# Patient Record
Sex: MALE
[De-identification: names, ages, dates, MRNs, and addresses within clinical notes are randomized per-mention and may not be internally consistent; named-entity substitution may affect disease eponyms.]

---

## 2017-07-04 ENCOUNTER — TRANSCRIPTION ENCOUNTER (OUTPATIENT)
Age: 37
End: 2017-07-04

## 2019-05-14 PROBLEM — Z00.00 ENCOUNTER FOR PREVENTIVE HEALTH EXAMINATION: Status: ACTIVE | Noted: 2019-05-14

## 2019-05-21 ENCOUNTER — APPOINTMENT (OUTPATIENT)
Dept: ORTHOPEDIC SURGERY | Facility: CLINIC | Age: 39
End: 2019-05-21

## 2019-05-22 ENCOUNTER — APPOINTMENT (OUTPATIENT)
Dept: ORTHOPEDIC SURGERY | Facility: CLINIC | Age: 39
End: 2019-05-22
Payer: COMMERCIAL

## 2019-05-22 VITALS
HEIGHT: 68 IN | BODY MASS INDEX: 37.13 KG/M2 | HEART RATE: 91 BPM | WEIGHT: 245 LBS | SYSTOLIC BLOOD PRESSURE: 117 MMHG | DIASTOLIC BLOOD PRESSURE: 83 MMHG

## 2019-05-22 DIAGNOSIS — Z78.9 OTHER SPECIFIED HEALTH STATUS: ICD-10-CM

## 2019-05-22 PROCEDURE — 73610 X-RAY EXAM OF ANKLE: CPT | Mod: RT

## 2019-05-22 PROCEDURE — 99203 OFFICE O/P NEW LOW 30 MIN: CPT

## 2019-05-22 RX ORDER — ASPIRIN 325 MG
325 TABLET ORAL
Refills: 0 | Status: ACTIVE | COMMUNITY

## 2019-05-22 NOTE — PHYSICAL EXAM
[de-identified] : General Exam\par \par Well developed, well nourished\par No apparent distress\par Oriented to person, place, and time\par Mood: Normal\par Affect: Normal\par Balance and coordination: Normal\par Gait: Normal\par \par Right ankle exam\par Skin: Clean, dry, intact\par Inspection: No obvious malalignment, no swelling, no effusion, divet noted over Achilles, +erythema lower calf\par Tenderness: +ttp achilles, No tenderness over the lateral malleolus, medial malleolus, CFL/ATFL/PTFL, deltoid ligament. No tenderness proximal fibula. No tenderness about heel, no pain with heel squeeze.\par ROM:weak plantar flexion, ROM intact\par Stability: Negative anterior/posterior drawer.\par Additional tests: Negative Mortons compression test, Negative syndesmosis squeeze test.\par Strength: 4/5 plantar flexion\par Sensation: Intact to light touch in dp/sp/tib/talha/saph distributions\par Pulses: 2+ DP/PT pulses [de-identified] : 3 views R ankle.  Preliminary report- no acute fx/dislocation noted.\par

## 2019-05-22 NOTE — HISTORY OF PRESENT ILLNESS
[de-identified] : 38-year-old male presents complaining of right ankle pain since May 11. He was playing softball when he went to the field a ground ball felt like someone kicked him in the calf with immediate pain posterior ankle. Bruising did develop. He feels like his ankle is weak. He continues to work. He is an . Denies numbness tingling\par \par The patient's past medical history, past surgical history, medications, allergies, and social history were reviewed by me today with the patient and documented accordingly. In addition, the patient's family history, which is noncontributory to this visit, was also reviewed.\par

## 2019-05-30 ENCOUNTER — APPOINTMENT (OUTPATIENT)
Dept: ORTHOPEDIC SURGERY | Facility: CLINIC | Age: 39
End: 2019-05-30
Payer: COMMERCIAL

## 2019-05-30 VITALS — HEART RATE: 85 BPM | DIASTOLIC BLOOD PRESSURE: 80 MMHG | SYSTOLIC BLOOD PRESSURE: 120 MMHG

## 2019-05-30 DIAGNOSIS — S86.001A UNSPECIFIED INJURY OF RIGHT ACHILLES TENDON, INITIAL ENCOUNTER: ICD-10-CM

## 2019-05-30 PROCEDURE — 99213 OFFICE O/P EST LOW 20 MIN: CPT

## 2019-05-30 NOTE — PHYSICAL EXAM
[de-identified] : Oriented to time, place, person\par Mood: Normal\par Affect: Normal\par Appearance: Healthy, well appearing, no acute distress.\par Gait: Antalgic\par Assistive Devices: CAM boot\par \par Right lower extremity exam\par \par Skin: Clean, dry, intact\par Inspection: No obvious malalignment, moderate calf swelling, no effusion, no palpable defect Achilles.\par Pulses: 2+ DP/PT pulses\par ROM: 5 degrees of dorsiflexion, 20 degrees of plantarflexion, normal subtalar motion. \par Tenderness: Tender over myotendinous junction right Achilles. Tender over the distal Achilles stump. No calf pain\par Stability: Stable \par Strength: 3/5 plantar flexion\par Neuro: In tact to light touch throughout\par Additional tests: Negative syndesmosis squeeze test. Flicker with Ruby test [de-identified] : Images were reviewed from [facility] dated 5.22.19. \par \par Multiple images right ankle showed no evidence of bony injury, or sam dislocation. There is no underlying degenerative arthritic change seen. Overall alignment is maintained. Otherwise unremarkable. Achilles tendon shadow intact distally.

## 2019-05-30 NOTE — DISCUSSION/SUMMARY
[de-identified] : 35-year-old male with right ankle injury\par \par Patient is approximately 3 weeks status post right ankle injury consistent with Achilles tendon dysfunction. Appears as though the injury occurred at the myotendinous junction of the right Achilles, and patient has a positive flicker on Ruby testing today. Concern for high-grade injury, but concern for delay in presentation and treatment. Unable to obtain authorization for MRI.\par \par At this time, I do not believe an MRI will  as recommendation would likely be for continued conservative management with a functional rehabilitation program. Discussed long-term outcomes of surgical versus nonsurgical management Achilles ruptures with and without a functional rehabilitation program. Patient as an , and reports that he has to work and is looking to avoid surgical intervention at this time.\par \par By patient request, MRI prescription provided for the lower extremity to rule out high-grade injury. PT prescription provided for functional rehabilitation program. Continue cam immobilization with heel lift per protocol.\par \par Followup 3 weeks, & after MRI

## 2019-06-18 ENCOUNTER — APPOINTMENT (OUTPATIENT)
Dept: ORTHOPEDIC SURGERY | Facility: CLINIC | Age: 39
End: 2019-06-18

## 2019-06-24 ENCOUNTER — APPOINTMENT (OUTPATIENT)
Dept: ORTHOPEDIC SURGERY | Facility: CLINIC | Age: 39
End: 2019-06-24

## 2021-01-22 ENCOUNTER — APPOINTMENT (OUTPATIENT)
Dept: PULMONOLOGY | Facility: CLINIC | Age: 41
End: 2021-01-22
Payer: COMMERCIAL

## 2021-01-22 VITALS
BODY MASS INDEX: 40.81 KG/M2 | HEART RATE: 92 BPM | HEIGHT: 67 IN | DIASTOLIC BLOOD PRESSURE: 72 MMHG | WEIGHT: 260 LBS | SYSTOLIC BLOOD PRESSURE: 108 MMHG | TEMPERATURE: 99.8 F | OXYGEN SATURATION: 97 %

## 2021-01-22 DIAGNOSIS — G47.9 SLEEP DISORDER, UNSPECIFIED: ICD-10-CM

## 2021-01-22 DIAGNOSIS — Z86.39 PERSONAL HISTORY OF OTHER ENDOCRINE, NUTRITIONAL AND METABOLIC DISEASE: ICD-10-CM

## 2021-01-22 DIAGNOSIS — R06.2 WHEEZING: ICD-10-CM

## 2021-01-22 DIAGNOSIS — Z87.898 PERSONAL HISTORY OF OTHER SPECIFIED CONDITIONS: ICD-10-CM

## 2021-01-22 PROCEDURE — 99203 OFFICE O/P NEW LOW 30 MIN: CPT

## 2021-01-22 PROCEDURE — 99072 ADDL SUPL MATRL&STAF TM PHE: CPT

## 2021-01-22 RX ORDER — ALBUTEROL SULFATE 90 UG/1
108 (90 BASE) INHALANT RESPIRATORY (INHALATION)
Refills: 0 | Status: ACTIVE | COMMUNITY
Start: 2021-01-22

## 2021-01-22 NOTE — DISCUSSION/SUMMARY
[FreeTextEntry1] : 41 yo male with complaints consistent with sleep apnea. PSG will be performed. Diet and weight loss discussed. He is to avoid sedative/hypnotic meds and excessive alcohol use. Given the patient's history of wheezing with seasonal allergies, use of montelukast may be needed in the future with continued PRN albuterol MDI use. PFT will be performed in the future. He is to follow up with his PMD as before.

## 2021-01-22 NOTE — PHYSICAL EXAM
[Normal Oropharynx] : normal oropharynx [No Acute Distress] : no acute distress [Normal Appearance] : normal appearance [No Neck Mass] : no neck mass [Normal Rate/Rhythm] : normal rate/rhythm [Normal S1, S2] : normal s1, s2 [No Murmurs] : no murmurs [No Resp Distress] : no resp distress [No Abnormalities] : no abnormalities [Benign] : benign [Normal Gait] : normal gait [No Clubbing] : no clubbing [No Cyanosis] : no cyanosis [No Edema] : no edema [FROM] : FROM [Normal Color/ Pigmentation] : normal color/ pigmentation [No Focal Deficits] : no focal deficits [Oriented x3] : oriented x3 [Normal Affect] : normal affect [TextBox_2] : Increased BMI [TextBox_68] : Prolonged expiratory phase

## 2021-01-22 NOTE — HISTORY OF PRESENT ILLNESS
[Current] : current [< 30 pack-years] : < 30 pack-years [Awakes Unrefreshed] : awakes unrefreshed [Awakes with Dry Mouth] : awakes with dry mouth [Fatigue] : fatigue [Hypersomnolence] : hypersomnolence [Snoring] : snoring [Witnessed Apneas] : witnessed apneas [TextBox_4] : 41 yo male presents for evaluation of possible sleep apnea. The patient snores with apneic episodes. He complains of daytime somnolence and fatigue. His weight has increased. The patient also complains of PRN wheezing, mostly with spring and fall allergies, without SOB or chest pain. He was recently prescribed inhaler.

## 2021-01-22 NOTE — REVIEW OF SYSTEMS
[Fatigue] : fatigue [Recent Wt Gain (___ Lbs)] : ~T recent [unfilled] lb weight gain [Wheezing] : wheezing [Negative] : Endocrine [Cough] : no cough [Sputum] : no sputum

## 2021-01-28 LAB — SARS-COV-2 N GENE NPH QL NAA+PROBE: NOT DETECTED

## 2021-02-03 ENCOUNTER — APPOINTMENT (OUTPATIENT)
Dept: PULMONOLOGY | Facility: CLINIC | Age: 41
End: 2021-02-03
Payer: COMMERCIAL

## 2021-02-03 PROCEDURE — 94729 DIFFUSING CAPACITY: CPT

## 2021-02-03 PROCEDURE — 94060 EVALUATION OF WHEEZING: CPT

## 2021-02-03 PROCEDURE — 99072 ADDL SUPL MATRL&STAF TM PHE: CPT

## 2021-02-03 PROCEDURE — 94727 GAS DIL/WSHOT DETER LNG VOL: CPT

## 2021-03-16 ENCOUNTER — APPOINTMENT (OUTPATIENT)
Dept: OTOLARYNGOLOGY | Facility: CLINIC | Age: 41
End: 2021-03-16

## 2021-04-27 ENCOUNTER — APPOINTMENT (OUTPATIENT)
Dept: PULMONOLOGY | Facility: CLINIC | Age: 41
End: 2021-04-27

## 2021-06-08 ENCOUNTER — APPOINTMENT (OUTPATIENT)
Dept: DISASTER EMERGENCY | Facility: OTHER | Age: 41
End: 2021-06-08
Payer: COMMERCIAL

## 2021-06-08 PROCEDURE — 0001A: CPT

## 2021-06-14 ENCOUNTER — APPOINTMENT (OUTPATIENT)
Dept: GASTROENTEROLOGY | Facility: CLINIC | Age: 41
End: 2021-06-14

## 2021-06-29 ENCOUNTER — APPOINTMENT (OUTPATIENT)
Dept: DISASTER EMERGENCY | Facility: OTHER | Age: 41
End: 2021-06-29
Payer: COMMERCIAL

## 2021-06-29 PROCEDURE — 0002A: CPT

## 2022-03-11 ENCOUNTER — APPOINTMENT (OUTPATIENT)
Dept: PULMONOLOGY | Facility: CLINIC | Age: 42
End: 2022-03-11

## 2023-01-03 NOTE — HISTORY OF PRESENT ILLNESS
Per Pippa she has not receive clearance or progress Notes will like a call back for a follow up. Sent a message on 12/28/22    CARDIAC CLEARANCE     Surgery: Cataract   Date of Procedure:  01/09/22  Person requesting clearance: Pippa     Doctor/Surgeon: Daniel Gibbons  Phone: [phone]     When was the patient's last appointment with a Cardiologist?:12/1/22  Does patient need to be off medication?:  suri Das is asking for Labs, EKG, stress test, progress last visit to be fax to [phone]  
Spoke to Pippa Palmer that patient is clear for Cataract Surgery on 01/09/2023 and holding Eliquis is entirely up to the ophthalmologist . If he insists on being off of it, he can hold three doses , otherwise he can stay on it.  Cardiac Clearance, last OV note and EKG , Stress Test fax to [phone] and [phone]    
[de-identified] : 38-year-old male presents complaining of right ankle pain since May 11, 2019. He was playing softball when he went to the field a ground ball felt like someone kicked him in the calf with immediate pain posterior ankle. Bruising did develop. He was evaluated by our after hours team and placed in a CAM boot. Also referred for MRI but unable to obtained it due to lack of authorization. He reinjured the right ankle in the shower on Sunday. He was kneeling down to get something, his foot slipped and he heard a crack in his ankle.  The pain is present with walking. He is not taking pain medication. \par \par The patient's past medical history, past surgical history, medications and allergies were reviewed by me today with the patient and documented accordingly. In addition, the patient's family and social history, which were noncontributory to this visit, were reviewed also.

## 2023-01-31 ENCOUNTER — NON-APPOINTMENT (OUTPATIENT)
Age: 43
End: 2023-01-31

## 2023-01-31 ENCOUNTER — APPOINTMENT (OUTPATIENT)
Dept: ORTHOPEDIC SURGERY | Facility: CLINIC | Age: 43
End: 2023-01-31
Payer: COMMERCIAL

## 2023-01-31 DIAGNOSIS — M75.42 IMPINGEMENT SYNDROME OF LEFT SHOULDER: ICD-10-CM

## 2023-01-31 PROCEDURE — 20610 DRAIN/INJ JOINT/BURSA W/O US: CPT | Mod: LT

## 2023-01-31 PROCEDURE — 99203 OFFICE O/P NEW LOW 30 MIN: CPT | Mod: 25

## 2023-02-01 NOTE — HISTORY OF PRESENT ILLNESS
[de-identified] : RHD Patient is here for left shoulder pain that began 8 days ago. He has been unable to lift it for 5 days. There was no inciting injury.  He is moving. He fell asleep on the couch with his arm behind his head. He went to an urgent care. He had xrays taken. He did not bring cd or report with him but was told there was no fracture. He was seen by a different orthopedic. He states they ordered an MRI. He was prescribed an NSAID. Denies prior injury. He works an . He does not exercise. \par \par The patient's past medical history, past surgical history, medications and allergies were reviewed by me today and documented accordingly. In addition, the patient's family and social history, which were noncontributory to this visit, were reviewed also. Intake form was reviewed. The patient has no family history of arthritis.

## 2023-02-01 NOTE — PROCEDURE
[de-identified] : Injection: Left  Shoulder Subacromial Space.\par Indication: Impingement.\par \par A discussion was had with the patient regarding this procedure and all questions were answered. All risks, benefits and alternatives were discussed. These included but were not limited to bleeding, infection, and allergic reaction.  A timeout was done to ensure correct side and patient agreed to the procedure.  A Nisqually erin was created on the skin utilizing a plastic needle cap to erin the anticipated point of entry. Alcohol was used to clean the skin, and Betadine was used to sterilize and prep the area in the posterior aspect of the shoulder. Ethyl chloride spray was then used as a topical anesthetic. A 22-gauge 1.5" needle was used to inject 2cc of 0.25% bupivacaine without epinephrine and 1cc of 40mg/ml methylprednisolone into the subacromial space. A sterile bandage was then applied. The patient tolerated the procedure well and there were no complications.\par \par

## 2023-02-01 NOTE — DISCUSSION/SUMMARY
[de-identified] : Discussed findings of today's exam and possible causes of patient's pain.  Educated patient on their most probable diagnosis of acute left shoulder pain due to subacromial impingement.  Reviewed possible courses of treatment, and we collaboratively decided best course of treatment at this time will include conservative management.  We discussed various treatment options as well as associated risk/benefits/alternatives and patient elected to proceed with cortisone injection today (see procedure note).  Informed the patient that the numbing medicine in today's injection will last for about 4-6 hours. The steroid that was injected will start to work in 1 to 2 days, peak at 1-2 weeks, and may last up to 1-2 months.  Patient will be started on a course of physical therapy to restore normal range of motion and strength as tolerated.  Follow up as needed.  Patient appreciates and agrees with current plan.\par \par I work as part of an academic orthopedic group and routinely have a physician in training (resident / fellow) working with me.  Any part of the history and physical exam performed by the physician in training was either directly reviewed and/or replicated by myself.  Any procedure performed by the physician in training was performed under my direct supervision and with the consent of the patient.\par \par This note was generated using dragon medical dictation software.  A reasonable effort has been made for proofreading its contents, but typos may still remain.  If there are any questions or points of clarification needed please notify my office.\par

## 2023-02-01 NOTE — PHYSICAL EXAM
[de-identified] : Constitutional: Well-nourished, well-developed, No acute distress\par Respiratory:  Good respiratory effort, no SOB\par Psychiatric: Pleasant and normal affect, alert and oriented x3\par Skin: Clean dry and intact B/L UE\par Musculoskeletal: normal except where as noted in regional exam\par \par \par Right Shoulder:\par APPEARANCE: no marked deformities, no swelling or malalignment\par POSITIVE TENDERNESS: none\par NONTENDER: supraspinatus, infraspinatus, teres minor, LH biceps, anterior and posterior capsule, AC joint\par ROM: full & painless, no scapular winging or dyskinesia present\par RESISTIVE TESTING: painless 5/5 resisted flex/ext, empty can/ER/IR, horizontal abd/add \par SPECIAL TESTS: neg Drop Arm, neg Empty Can, neg Vaca/Neers, neg Enrique's, neg Speeds, neg Apprehension, neg cross arm adduction, neg apley's scratch test\par \par Left Shoulder:\par APPEARANCE: no marked deformities, no swelling or malalignment\par POSITIVE TENDERNESS: supraspinatus, long head biceps tendon\par NONTENDER:  infraspinatus, teres minor. biceps. anterior and posterior capsule. AC joint. \par ROM: Flexion/abduction limited to 90 degrees painful arc past 60 degrees, no scapular winging or dyskinesia present\par RESISTIVE TESTING: MMT 4+/5 ER, Flexion and Empty can, 5/5 IR. painless 5/5 resisted ext, horizontal abd/add \par SPECIAL TESTS: + Vaca and Neers, mildly + cross arm adduction, + Speeds, neg Enrique's, neg Drop Arm, neg Apprehension. neg apley's scratch test\par \par

## 2024-01-05 ENCOUNTER — APPOINTMENT (OUTPATIENT)
Dept: OPHTHALMOLOGY | Facility: CLINIC | Age: 44
End: 2024-01-05

## 2024-07-12 ENCOUNTER — APPOINTMENT (OUTPATIENT)
Dept: OPHTHALMOLOGY | Facility: CLINIC | Age: 44
End: 2024-07-12

## 2024-09-27 ENCOUNTER — APPOINTMENT (OUTPATIENT)
Dept: OPHTHALMOLOGY | Facility: CLINIC | Age: 44
End: 2024-09-27

## 2025-01-08 ENCOUNTER — NON-APPOINTMENT (OUTPATIENT)
Age: 45
End: 2025-01-08

## 2025-08-01 ENCOUNTER — APPOINTMENT (OUTPATIENT)
Dept: OPHTHALMOLOGY | Facility: CLINIC | Age: 45
End: 2025-08-01